# Patient Record
(demographics unavailable — no encounter records)

---

## 2024-12-31 NOTE — HISTORY OF PRESENT ILLNESS
[Initial Evaluation] : an initial evaluation of [Excessive Daytime Sleepiness] : excessive daytime sleepiness [Witnessed Gasping During Sleep] : witnessed gasping during sleep [Snoring] : snoring [Unrefreshing Sleep] : unrefreshing sleep [Memory Problems] : memory problems [Currently Experiencing] : The patient is currently experiencing symptoms. [Morning Headaches] : morning headaches [None] : The patient is not currently being treated for this problem [Cardiac Arrhythmia] : cardiac arrhythmia

## 2025-02-28 NOTE — ADDENDUM
[FreeTextEntry1] : Penelope WILLIAMSON assisted in documentation on 02/28/2025   acting as a scribe for Dr. Ed Gandhi.

## 2025-02-28 NOTE — HISTORY OF PRESENT ILLNESS
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 He was put on BB by his cardiologist which was changed to CC. On last visit CC was discontinued and patient was started on Multaq on Nov 8, 2019  Patient comes to the office for routine follow-up. Patient states that he is feeling very well, no palpitations while taking Multaq. Patient also had a stress test with Dr. Alcaraz that was negative.     08/29/2024: Patient comes to the office for routine follow-up. Patient was feeling great until approximately 2 weeks ago, when he developed 8 days of palpitations. On his mobile device, he was found to have PVCs and episodes of APCs. Patient denies any syncope, chest pain, nausea, or vomiting.   02/28/2025: Patient comes to the office today for follow-up. Patient states he feels great and had no episodes since 08/2024. He is taking Multaq, states he has not used Metoprolol PRN at all.     EKG (02/28/2025): Sinus rhythm at 84 bpm. EKG (08/29/2024): Sinus rhythm at 86 bpm. EKG (12/01/2023): Sinus rhythm at 71 bpm   Event monitor (10/2021): Rare PVCs, some APCs, and run of short 4 beats of asymptomatic non-sustained VT.

## 2025-02-28 NOTE — ASSESSMENT
[FreeTextEntry1] : PVCs/ PACs/ Palpitations  - Discussed with patient different options. Patient has been feeling very well.   - Recommended stopping Multaq and assess how patient does. Explained that if patient's PVC return, we will restart Multaq. - Stop Multaq.   - Patient with Metoprolol PRN.

## 2025-02-28 NOTE — CARDIOLOGY SUMMARY
[de-identified] : 12/2/2022 - 81 bpm sinus rhythm [de-identified] : 5/24/2021- normal stress test LVEF 76%- target HR achieved. [de-identified] : 5/17/2022 LVEF 61%

## 2025-02-28 NOTE — PHYSICAL EXAM
[General Appearance - Well Developed] : well developed [Normal Appearance] : normal appearance [Well Groomed] : well groomed [General Appearance - Well Nourished] : well nourished [No Deformities] : no deformities [General Appearance - In No Acute Distress] : no acute distress [Normal Oropharynx] : normal oropharynx [] : no respiratory distress [Respiration, Rhythm And Depth] : normal respiratory rhythm and effort [Heart Rate And Rhythm] : heart rate and rhythm were normal [Heart Sounds] : normal S1 and S2 [Bowel Sounds] : normal bowel sounds [Abdomen Soft] : soft [Abnormal Walk] : normal gait [Nail Clubbing] : no clubbing of the fingernails [Cyanosis, Localized] : no localized cyanosis [Skin Color & Pigmentation] : normal skin color and pigmentation [Skin Turgor] : normal skin turgor [Oriented To Time, Place, And Person] : oriented to person, place, and time [No Anxiety] : not feeling anxious [Well Developed] : well developed [Well Nourished] : well nourished [No Acute Distress] : no acute distress [Normal Conjunctiva] : normal conjunctiva [Normal Venous Pressure] : normal venous pressure [No Carotid Bruit] : no carotid bruit [Normal S1, S2] : normal S1, S2 [No Murmur] : no murmur [No Rub] : no rub [No Gallop] : no gallop [Clear Lung Fields] : clear lung fields [Good Air Entry] : good air entry [No Respiratory Distress] : no respiratory distress  [Soft] : abdomen soft [Non Tender] : non-tender [No Masses/organomegaly] : no masses/organomegaly [Normal Bowel Sounds] : normal bowel sounds [Normal Gait] : normal gait [No Edema] : no edema [No Cyanosis] : no cyanosis [No Clubbing] : no clubbing [No Varicosities] : no varicosities [No Rash] : no rash [No Skin Lesions] : no skin lesions [Moves all extremities] : moves all extremities [No Focal Deficits] : no focal deficits [Normal Speech] : normal speech [Alert and Oriented] : alert and oriented [Normal memory] : normal memory [FreeTextEntry1] : no jvd

## 2025-02-28 NOTE — END OF VISIT
[Time Spent: ___ minutes] : I have spent [unfilled] minutes of time on the encounter which excludes teaching and separately reported services. [FreeTextEntry3] :  All medical record entries made by my scribe were at my, Dr. Ed Gandhi, direction and personally dictated by me. I have reviewed the chart and agree that the record accurately reflects my personal performance of the history, physical exam, assessment and plan. I have also personally directed, reviewed, and agreed with the chart.

## 2025-03-06 NOTE — REASON FOR VISIT
[Home] : at home, [unfilled] , at the time of the visit. [Medical Office: ([facility])___] : at the medical office located in  [Telehealth (audio & video)] : This visit was provided via telehealth using real-time 2-way audio visual technology. [Follow-Up] : a follow-up visit [Sleep Apnea] : sleep apnea

## 2025-05-01 NOTE — REASON FOR VISIT
[Home] : at home, [unfilled] , at the time of the visit. [Telehealth (audio & video)] : This visit was provided via telehealth using real-time 2-way audio visual technology. [Follow-Up] : a follow-up visit [Sleep Apnea] : sleep apnea

## 2025-06-27 NOTE — HISTORY OF PRESENT ILLNESS
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 He was put on BB by his cardiologist which was changed to CC. On last visit CC was discontinued and patient was started on Multaq on Nov 8, 2019  Patient comes to the office for routine follow-up. Patient states that he is feeling very well, no palpitations while taking Multaq. Patient also had a stress test with Dr. Alcaraz that was negative.     08/29/2024: Patient comes to the office for routine follow-up. Patient was feeling great until approximately 2 weeks ago, when he developed 8 days of palpitations. On his mobile device, he was found to have PVCs and episodes of APCs. Patient denies any syncope, chest pain, nausea, or vomiting.   02/28/2025: Patient comes to the office today for follow-up. Patient states he feels great and had no episodes since 08/2024. He is taking Multaq, states he has not used Metoprolol PRN at all.   06/27/2025: Patient comes to the office today for follow-up. Patient is doing great. No increase in episodes of palpitations since stopping Multaq. Patient is not taking beta blockers.      EKG (06/27/2025): Sinus rhythm at 77 bpm, no PVCs. EKG (02/28/2025): Sinus rhythm at 84 bpm. EKG (08/29/2024): Sinus rhythm at 86 bpm. EKG (12/01/2023): Sinus rhythm at 71 bpm   Event monitor (10/2021): Rare PVCs, some APCs, and run of short 4 beats of asymptomatic non-sustained VT.

## 2025-06-27 NOTE — ASSESSMENT
[FreeTextEntry1] : PVCs/ PACs/ Palpitations  - Patient is doing excellent. No significant symptoms after stopping Multaq.   - Continue Metoprolol PRN.   - Follow-up as needed.

## 2025-06-27 NOTE — END OF VISIT
[Time Spent: ___ minutes] : I have spent [unfilled] minutes of time on the encounter which excludes teaching and separately reported services. [FreeTextEntry3] :  All medical record entries made by my scribe were at my, Dr. dE Gandhi, direction and personally dictated by me. I have reviewed the chart and agree that the record accurately reflects my personal performance of the history, physical exam, assessment and plan. I have also personally directed, reviewed, and agreed with the chart.

## 2025-06-27 NOTE — PHYSICAL EXAM
[General Appearance - Well Developed] : well developed [Normal Appearance] : normal appearance [Well Groomed] : well groomed [General Appearance - Well Nourished] : well nourished [No Deformities] : no deformities [General Appearance - In No Acute Distress] : no acute distress [Normal Oropharynx] : normal oropharynx [] : no respiratory distress [Respiration, Rhythm And Depth] : normal respiratory rhythm and effort [Heart Rate And Rhythm] : heart rate and rhythm were normal [Heart Sounds] : normal S1 and S2 [Bowel Sounds] : normal bowel sounds [Abdomen Soft] : soft [Abnormal Walk] : normal gait [Nail Clubbing] : no clubbing of the fingernails [Cyanosis, Localized] : no localized cyanosis [Skin Color & Pigmentation] : normal skin color and pigmentation [Skin Turgor] : normal skin turgor [Oriented To Time, Place, And Person] : oriented to person, place, and time [No Anxiety] : not feeling anxious [Well Developed] : well developed [Well Nourished] : well nourished [No Acute Distress] : no acute distress [Normal Conjunctiva] : normal conjunctiva [Normal Venous Pressure] : normal venous pressure [No Carotid Bruit] : no carotid bruit [Normal S1, S2] : normal S1, S2 [No Murmur] : no murmur [No Rub] : no rub [No Gallop] : no gallop [Clear Lung Fields] : clear lung fields [Good Air Entry] : good air entry [No Respiratory Distress] : no respiratory distress  [Soft] : abdomen soft [Non Tender] : non-tender [No Masses/organomegaly] : no masses/organomegaly [Normal Bowel Sounds] : normal bowel sounds [Normal Gait] : normal gait [No Edema] : no edema [No Cyanosis] : no cyanosis [No Clubbing] : no clubbing [No Varicosities] : no varicosities [No Rash] : no rash [No Skin Lesions] : no skin lesions [Moves all extremities] : moves all extremities [No Focal Deficits] : no focal deficits [Normal Speech] : normal speech [Alert and Oriented] : alert and oriented [Normal memory] : normal memory [FreeTextEntry1] : no jvd Never

## 2025-06-27 NOTE — CARDIOLOGY SUMMARY
[de-identified] : 12/2/2022 - 81 bpm sinus rhythm [de-identified] : 5/24/2021- normal stress test LVEF 76%- target HR achieved. [de-identified] : 5/17/2022 LVEF 61%

## 2025-06-27 NOTE — ADDENDUM
[FreeTextEntry1] : Penelope WILLIAMSON assisted in documentation on 06/27/2025   acting as a scribe for Dr. Ed Gandhi.